# Patient Record
Sex: MALE | Race: WHITE | NOT HISPANIC OR LATINO | Employment: FULL TIME | URBAN - METROPOLITAN AREA
[De-identification: names, ages, dates, MRNs, and addresses within clinical notes are randomized per-mention and may not be internally consistent; named-entity substitution may affect disease eponyms.]

---

## 2020-10-14 ENCOUNTER — OFFICE VISIT (OUTPATIENT)
Dept: URGENT CARE | Facility: CLINIC | Age: 40
End: 2020-10-14
Payer: COMMERCIAL

## 2020-10-14 VITALS
BODY MASS INDEX: 26.48 KG/M2 | OXYGEN SATURATION: 100 % | DIASTOLIC BLOOD PRESSURE: 80 MMHG | TEMPERATURE: 96 F | HEART RATE: 100 BPM | SYSTOLIC BLOOD PRESSURE: 117 MMHG | HEIGHT: 70 IN | WEIGHT: 185 LBS

## 2020-10-14 DIAGNOSIS — R09.81 SINUS CONGESTION: Primary | ICD-10-CM

## 2020-10-14 DIAGNOSIS — Z72.0 TOBACCO USE: ICD-10-CM

## 2020-10-14 LAB
CTP QC/QA: YES
SARS-COV-2 RDRP RESP QL NAA+PROBE: NEGATIVE

## 2020-10-14 PROCEDURE — 99203 OFFICE O/P NEW LOW 30 MIN: CPT | Mod: S$GLB,,, | Performed by: NURSE PRACTITIONER

## 2020-10-14 PROCEDURE — 99203 PR OFFICE/OUTPT VISIT, NEW, LEVL III, 30-44 MIN: ICD-10-PCS | Mod: S$GLB,,, | Performed by: NURSE PRACTITIONER

## 2020-10-14 PROCEDURE — U0002 COVID-19 LAB TEST NON-CDC: HCPCS | Mod: QW,S$GLB,, | Performed by: NURSE PRACTITIONER

## 2020-10-14 PROCEDURE — U0002: ICD-10-PCS | Mod: QW,S$GLB,, | Performed by: NURSE PRACTITIONER

## 2020-10-14 RX ORDER — FLUTICASONE PROPIONATE 50 MCG
1 SPRAY, SUSPENSION (ML) NASAL DAILY
Qty: 9.9 ML | Refills: 0 | Status: SHIPPED | OUTPATIENT
Start: 2020-10-14

## 2020-10-14 RX ORDER — CETIRIZINE HYDROCHLORIDE 10 MG/1
10 TABLET ORAL DAILY
Qty: 10 TABLET | Refills: 0 | Status: SHIPPED | OUTPATIENT
Start: 2020-10-14 | End: 2020-10-24

## 2020-10-14 NOTE — LETTER
[address] [facility], [facility] ? [place], [address] ? Phone [phone] ? Fax [phone]           Return to Work/School    Patient: Rm Almaraz Jr.  YOB: 1980   Date: 10/14/2020      To Whom It May Concern:     Rm Almaraz Jr. was in contact with/seen in my office on 10/14/2020. COVID-19 is present in our communities across the state. Not all patients are eligible or appropriate to be tested. In this situation, your employee meets the following criteria:     Rm Almaraz Jr. has met the criteria for COVID-19 testing and has a NEGATIVE result. The employee can return to work once they are asymptomatic for 24 hours without the use of fever reducing medications (Tylenol, Motrin, etc).     If you have any questions or concerns, or if I can be of further assistance, please do not hesitate to contact me.     Sincerely,      Cleo Suazo NP

## 2020-10-14 NOTE — PATIENT INSTRUCTIONS
You have been diagnosed with a viral illness. Antibiotics will not help your infection to go away any faster.  You immune system must fight this illness.  You will likely have symptoms for 7-10 days as this is how long a typical virus lasts.  Below are a few things that you can do at home to help yourself feel better in the mean time.     1.  For patients above 6 months of age who are not allergic to and are not on anticoagulants, you can alternate Tylenol and Motrin every 4-6 hours for fever above 100.4F and/or pain.  For patients less than 6 months of age, allergic to or intolerant to NSAIDS, have gastritis, gastric ulcers, or history of GI bleeds, are pregnant, or are on anticoagulant therapy, you can take Tylenol every 4 hours as needed for fever above 100.4F and/or pain.     2.  Rest and keep yourself well hydrated.  Drink hot liquids (coffee, water, tea, hot chocolate, or soup) 10-12 times a day for 5-7 days.  Put liquid in a mug and place in microwave for 2.5 - 3 minutes. Pour hot liquid into another mug not used to microwave the liquid (to avoid burning your mouth) then sniff the steam from the cup and sip the heated liquid.    3.  You can use these over the counter medications/remedies to help with your symptoms:     Runny Nose:  Use an antihistamine such as Claritin, Zyrtec or Allegra to help dry you out.     Congestion:  Use pseudoephedrine (behind the counter) for congestion- Pseudoephedrine 30 mg up to 240 mg /day. Warning:  It can raise your blood pressure and give you palpitations.  Coricidin HBP is okay to use if you have high blood pressure.     Use mucinex (guaifenisin) up to 2400mg/day to break up/loosen any mucous. MucinexDM has a cough suppressant that can be used for cough and at night to stop the tickle in the back of your throat.    Use Nasal Saline to mechanically move any post nasal drip from your eustachian tubes or from the back of your throat.    Use Afrin in each nare, for no longer  than 3 days, as it is addictive. It can also dry out your mucous membranes and cause elevated blood pressure.    Use Flonase 1-2 sprays/nostril per day. It is a local acting steroid nasal spray.  If you develop a bloody nose, stop using the medication immediately.    Sore throat:  Use warm, salt water gargles to ease your throat pain- 1/2 tsp salt to 1 cup warm water, gargle as desired.  Chloraseptic sprays and throat lozenges will also help to ease throat pain.     Sometimes Nyquil at night is beneficial to help you get some rest; however, it is sedating and does contain an antihistamine and Tylenol.  Make sure not to double up on these medications.      These things will help you to feel better and will speed your recovery.  If your condition fails to improve in a timely manner, you should receive another evaluation by your Primary Care Provider/Pediatrician to discuss your concerns or return to urgent care for a recheck.  If your condition worsens at any time, you should report immediately to your nearest Emergency Department for further evaluation. **You must understand that you have received Urgent Care treatment only and that you may be released before all of your medical problems are known or treated. You, the patient, are responsible to arrange for follow-up care as instructed.

## 2020-10-14 NOTE — PROGRESS NOTES
"Subjective:       Patient ID: Rm Almaraz Jr. is a 40 y.o. male.    Vitals:  height is 5' 10" (1.778 m) and weight is 83.9 kg (185 lb). His temperature is 96.1 °F (35.6 °C). His blood pressure is 117/80 and his pulse is 100. His oxygen saturation is 100%.     Chief Complaint: Cough    40 year old male reports sinus congestion, a drip in his throat and popping ears since Monday. Pt denies fever.     Cough  This is a new problem. The current episode started in the past 7 days. The cough is productive of purulent sputum. Associated symptoms include headaches, myalgias and postnasal drip. Pertinent negatives include no chills, ear pain, eye redness, fever, hemoptysis, rash, sore throat, shortness of breath or wheezing. Treatments tried: motrin, bc powder. The treatment provided no relief. There is no history of asthma, bronchiectasis, bronchitis, COPD, emphysema, environmental allergies or pneumonia.       Constitution: Negative for activity change, appetite change, chills, sweating, fatigue, fever, unexpected weight change, generalized weakness and international travel in last 60 days.   HENT: Positive for postnasal drip. Negative for ear pain, congestion, sinus pain, sinus pressure, sore throat and voice change.    Neck: Negative for painful lymph nodes.   Eyes: Negative for eye redness.   Respiratory: Positive for cough and sputum production (clear). Negative for sleep apnea, chest tightness, bloody sputum, COPD, shortness of breath, stridor, wheezing and asthma.    Gastrointestinal: Negative for abdominal pain, nausea, vomiting and diarrhea.   Musculoskeletal: Positive for muscle ache.   Skin: Negative for rash.   Allergic/Immunologic: Negative for environmental allergies, seasonal allergies, asthma, immunizations up-to-date and flu shot.   Neurological: Positive for headaches.   Hematologic/Lymphatic: Negative for swollen lymph nodes.       Objective:      Physical Exam   Constitutional: He is oriented to " person, place, and time. He appears well-developed. He is cooperative.  Non-toxic appearance. He does not appear ill. No distress.   HENT:   Head: Normocephalic and atraumatic.   Ears:   Right Ear: Hearing, external ear and ear canal normal. A middle ear effusion is present.   Left Ear: Hearing, external ear and ear canal normal. A middle ear effusion is present.   Nose: Congestion present. No mucosal edema, rhinorrhea or nasal deformity. No epistaxis. Right sinus exhibits no maxillary sinus tenderness and no frontal sinus tenderness. Left sinus exhibits no maxillary sinus tenderness and no frontal sinus tenderness.   Mouth/Throat: Uvula is midline, oropharynx is clear and moist and mucous membranes are normal. No trismus in the jaw. Normal dentition. No uvula swelling. No oropharyngeal exudate, posterior oropharyngeal edema or posterior oropharyngeal erythema.   Eyes: Conjunctivae and lids are normal. No scleral icterus.   Neck: Trachea normal, full passive range of motion without pain and phonation normal. Neck supple. No neck rigidity. No edema and no erythema present.   Cardiovascular: Normal rate, regular rhythm, normal heart sounds and normal pulses.   Pulmonary/Chest: Effort normal and breath sounds normal. No accessory muscle usage. No respiratory distress. He has no decreased breath sounds. He has no wheezes. He has no rhonchi.   Abdominal: Normal appearance.   Musculoskeletal: Normal range of motion.         General: No deformity.   Neurological: He is alert and oriented to person, place, and time. He exhibits normal muscle tone. Coordination normal.   Skin: Skin is warm, dry, intact, not diaphoretic and not pale. Psychiatric: His speech is normal and behavior is normal. Judgment and thought content normal.   Nursing note and vitals reviewed.        Assessment:       1. Sinus congestion    2. Tobacco use        Plan:         Sinus congestion  -     POCT COVID-19 Rapid Screening  -     fluticasone  propionate (FLONASE) 50 mcg/actuation nasal spray; 1 spray (50 mcg total) by Each Nostril route once daily.  Dispense: 9.9 mL; Refill: 0  -     cetirizine (ZYRTEC) 10 MG tablet; Take 1 tablet (10 mg total) by mouth once daily. for 10 days  Dispense: 10 tablet; Refill: 0    Tobacco use  -     Ambulatory referral/consult to Smoking Cessation Program      Results for orders placed or performed in visit on 10/14/20   POCT COVID-19 Rapid Screening   Result Value Ref Range    POC Rapid COVID Negative Negative     Acceptable Yes      Patient Instructions   You have been diagnosed with a viral illness. Antibiotics will not help your infection to go away any faster.  You immune system must fight this illness.  You will likely have symptoms for 7-10 days as this is how long a typical virus lasts.  Below are a few things that you can do at home to help yourself feel better in the mean time.     1.  For patients above 6 months of age who are not allergic to and are not on anticoagulants, you can alternate Tylenol and Motrin every 4-6 hours for fever above 100.4F and/or pain.  For patients less than 6 months of age, allergic to or intolerant to NSAIDS, have gastritis, gastric ulcers, or history of GI bleeds, are pregnant, or are on anticoagulant therapy, you can take Tylenol every 4 hours as needed for fever above 100.4F and/or pain.     2.  Rest and keep yourself well hydrated.  Drink hot liquids (coffee, water, tea, hot chocolate, or soup) 10-12 times a day for 5-7 days.  Put liquid in a mug and place in microwave for 2.5 - 3 minutes. Pour hot liquid into another mug not used to microwave the liquid (to avoid burning your mouth) then sniff the steam from the cup and sip the heated liquid.    3.  You can use these over the counter medications/remedies to help with your symptoms:     Runny Nose:  Use an antihistamine such as Claritin, Zyrtec or Allegra to help dry you out.     Congestion:  Use pseudoephedrine  (behind the counter) for congestion- Pseudoephedrine 30 mg up to 240 mg /day. Warning:  It can raise your blood pressure and give you palpitations.  Coricidin HBP is okay to use if you have high blood pressure.     Use mucinex (guaifenisin) up to 2400mg/day to break up/loosen any mucous. MucinexDM has a cough suppressant that can be used for cough and at night to stop the tickle in the back of your throat.    Use Nasal Saline to mechanically move any post nasal drip from your eustachian tubes or from the back of your throat.    Use Afrin in each nare, for no longer than 3 days, as it is addictive. It can also dry out your mucous membranes and cause elevated blood pressure.    Use Flonase 1-2 sprays/nostril per day. It is a local acting steroid nasal spray.  If you develop a bloody nose, stop using the medication immediately.    Sore throat:  Use warm, salt water gargles to ease your throat pain- 1/2 tsp salt to 1 cup warm water, gargle as desired.  Chloraseptic sprays and throat lozenges will also help to ease throat pain.     Sometimes Nyquil at night is beneficial to help you get some rest; however, it is sedating and does contain an antihistamine and Tylenol.  Make sure not to double up on these medications.      These things will help you to feel better and will speed your recovery.  If your condition fails to improve in a timely manner, you should receive another evaluation by your Primary Care Provider/Pediatrician to discuss your concerns or return to urgent care for a recheck.  If your condition worsens at any time, you should report immediately to your nearest Emergency Department for further evaluation. **You must understand that you have received Urgent Care treatment only and that you may be released before all of your medical problems are known or treated. You, the patient, are responsible to arrange for follow-up care as instructed.

## 2022-10-01 ENCOUNTER — OFFICE VISIT (OUTPATIENT)
Dept: URGENT CARE | Facility: CLINIC | Age: 42
End: 2022-10-01
Payer: MEDICAID

## 2022-10-01 VITALS
BODY MASS INDEX: 27.2 KG/M2 | HEART RATE: 82 BPM | HEIGHT: 70 IN | TEMPERATURE: 97 F | DIASTOLIC BLOOD PRESSURE: 81 MMHG | SYSTOLIC BLOOD PRESSURE: 127 MMHG | RESPIRATION RATE: 16 BRPM | OXYGEN SATURATION: 98 % | WEIGHT: 190 LBS

## 2022-10-01 DIAGNOSIS — M25.521 CHRONIC PAIN OF RIGHT ELBOW: Primary | ICD-10-CM

## 2022-10-01 DIAGNOSIS — G89.29 CHRONIC PAIN OF RIGHT ELBOW: Primary | ICD-10-CM

## 2022-10-01 DIAGNOSIS — M77.9 TENDONITIS: ICD-10-CM

## 2022-10-01 PROCEDURE — 99214 OFFICE O/P EST MOD 30 MIN: CPT | Mod: S$GLB,,, | Performed by: FAMILY MEDICINE

## 2022-10-01 PROCEDURE — 99214 PR OFFICE/OUTPT VISIT, EST, LEVL IV, 30-39 MIN: ICD-10-PCS | Mod: S$GLB,,, | Performed by: FAMILY MEDICINE

## 2022-10-01 RX ORDER — TRAMADOL HYDROCHLORIDE 50 MG/1
50 TABLET ORAL EVERY 6 HOURS PRN
Qty: 10 TABLET | Refills: 0 | Status: SHIPPED | OUTPATIENT
Start: 2022-10-01 | End: 2023-10-01

## 2022-10-01 RX ORDER — ACETAMINOPHEN 500 MG
500 TABLET ORAL EVERY 6 HOURS PRN
COMMUNITY

## 2022-10-01 RX ORDER — NAPROXEN 500 MG/1
500 TABLET ORAL 2 TIMES DAILY WITH MEALS
Qty: 20 TABLET | Refills: 0 | Status: SHIPPED | OUTPATIENT
Start: 2022-10-01

## 2022-10-01 NOTE — PATIENT INSTRUCTIONS

## 2022-10-01 NOTE — PROGRESS NOTES
"Subjective:       Patient ID: Rm Almaraz Jr. is a 42 y.o. male.    Vitals:  height is 5' 10" (1.778 m) and weight is 86.2 kg (190 lb). His oral temperature is 97.1 °F (36.2 °C). His blood pressure is 127/81 and his pulse is 82. His respiration is 16 and oxygen saturation is 98%.     Chief Complaint: Joint Swelling (Patient c/o right elbow pain and swelling, states pain is worse in the a.m.  Denies injury that he knows of. Pain is worse when moving the arm around or trying to lift objects. Taking Tylenol bid with mild relief.)    Patient c/o right elbow pain and swelling, states pain is worse in the a.m.  Denies injury that he knows of. Pain is worse when moving the arm around or trying to lift objects. Taking Tylenol bid with mild relief. Last xray of the elbow was about 4 months ago.     Edema  This is a recurrent problem. The current episode started in the past 7 days. The problem occurs constantly. The problem has been unchanged. Associated symptoms include joint swelling and numbness. Pertinent negatives include no weakness. The symptoms are aggravated by bending and twisting. He has tried acetaminophen for the symptoms. The treatment provided no relief.     Constitution: Negative.   HENT: Negative.     Cardiovascular: Negative.    Eyes: Negative.    Respiratory: Negative.     Gastrointestinal: Negative.    Endocrine: negative.   Genitourinary: Negative.    Musculoskeletal:  Positive for joint swelling.   Skin: Negative.    Allergic/Immunologic: Negative.    Neurological:  Positive for numbness.   Hematologic/Lymphatic: Negative.    Psychiatric/Behavioral: Negative.       Objective:      Physical Exam   Constitutional: He is oriented to person, place, and time. He appears well-developed. He is cooperative.  Non-toxic appearance. He does not appear ill. No distress.   HENT:   Head: Normocephalic and atraumatic. Head is without abrasion, without contusion and without laceration.   Ears:   Right Ear: Hearing, " tympanic membrane, external ear and ear canal normal. No hemotympanum.   Left Ear: Hearing, tympanic membrane, external ear and ear canal normal. No hemotympanum.   Nose: Nose normal. No mucosal edema, rhinorrhea or nasal deformity. No epistaxis. Right sinus exhibits no maxillary sinus tenderness and no frontal sinus tenderness. Left sinus exhibits no maxillary sinus tenderness and no frontal sinus tenderness.   Mouth/Throat: Uvula is midline, oropharynx is clear and moist and mucous membranes are normal. No trismus in the jaw. Normal dentition. No uvula swelling. No posterior oropharyngeal erythema.   Eyes: Conjunctivae, EOM and lids are normal. Pupils are equal, round, and reactive to light. Right eye exhibits no discharge. Left eye exhibits no discharge. No scleral icterus.   Neck: Trachea normal and phonation normal. Neck supple. No tracheal deviation present. No neck rigidity present. No spinous process tenderness present. No muscular tenderness present.   Cardiovascular: Normal rate, regular rhythm, normal heart sounds and normal pulses.   Pulmonary/Chest: Effort normal and breath sounds normal. No respiratory distress.   Abdominal: Normal appearance and bowel sounds are normal. He exhibits no distension and no mass. Soft. There is no abdominal tenderness.   Musculoskeletal:         General: No deformity.      Right elbow: He exhibits decreased range of motion. Tenderness found.        Arms:    Neurological: He is alert and oriented to person, place, and time. He has normal strength. No cranial nerve deficit or sensory deficit. He exhibits normal muscle tone. He displays no seizure activity. Coordination normal. GCS eye subscore is 4. GCS verbal subscore is 5. GCS motor subscore is 6.   Skin: Skin is warm, dry, intact, not diaphoretic and not pale. Capillary refill takes less than 2 seconds. No abrasion, No burn, No bruising and No ecchymosis   Psychiatric: His speech is normal and behavior is normal. Judgment  and thought content normal.   Nursing note and vitals reviewed.      Assessment:       1. Chronic pain of right elbow    2. Tendonitis          Plan:     Offered Xray, declined by patient.    Chronic pain of right elbow    Tendonitis  -     naproxen (NAPROSYN) 500 MG tablet; Take 1 tablet (500 mg total) by mouth 2 (two) times daily with meals.  Dispense: 20 tablet; Refill: 0  -     traMADoL (ULTRAM) 50 mg tablet; Take 1 tablet (50 mg total) by mouth every 6 (six) hours as needed for Pain.  Dispense: 10 tablet; Refill: 0        Please drink plenty of fluids.  Please get plenty of rest.  Please return here or go to the Emergency Department for any concerns or worsening of condition.  If you were prescribed a narcotic medication, do not drive or operate heavy equipment or machinery while taking these medications.  If you were not prescribed an anti-inflammatory medication, and if you do not have any history of stomach/intestinal ulcers, or kidney disease, or are not taking a blood thinner such as Coumadin, Plavix, Pradaxa, Eloquis, or Xaralta for example, it is OK to take over the counter Ibuprofen or Advil or Motrin or Aleve as directed.  Do not take these medications on an empty stomach.  Rest, ice, compression and elevation to the affected joint or limb as needed.    If you were given a sling, wear it for comfort until follow up as arranged.  If you were given or placed in a splint, wear it until your follow up visit or recheck.  If you  smoke, please stop smoking.       Please follow up with your primary care doctor or specialist as needed.    [facility]  [phone]    You must understand that you have received treatment at an Urgent Care facility only, and that you may be  released before all of your medical problems are known or treated. Urgent Care facilities are not equipped to  handle life threatening emergencies. It is recommended that you seek care at an Emergency Department for  further  evaluation of worsening or concerning symptoms, or possibly life threatening conditions as  discussed.

## 2022-10-01 NOTE — LETTER
October 1, 2022  Rm Almaraz Jr.  [address] [place] [address] [address] [place] [id number]                [facility]  [address] [place] [address], [facility]  [place] [address]  Phone: [phone]  Fax: [phone] Rm Almaraz was seen and treated in our Urgent Care department on 10/1/2022. He may return to work in 2 - 3 days.      If you have any questions or concerns, please don't hesitate to call.        Sincerely,        Bandar Britt MD

## 2023-05-01 ENCOUNTER — HOSPITAL ENCOUNTER (EMERGENCY)
Facility: HOSPITAL | Age: 43
Discharge: HOME | End: 2023-05-01
Attending: STUDENT IN AN ORGANIZED HEALTH CARE EDUCATION/TRAINING PROGRAM
Payer: MEDICAID

## 2023-05-01 VITALS
TEMPERATURE: 100 F | SYSTOLIC BLOOD PRESSURE: 144 MMHG | HEART RATE: 110 BPM | BODY MASS INDEX: 25.91 KG/M2 | RESPIRATION RATE: 18 BRPM | DIASTOLIC BLOOD PRESSURE: 81 MMHG | OXYGEN SATURATION: 97 % | WEIGHT: 181 LBS | HEIGHT: 70 IN

## 2023-05-01 DIAGNOSIS — D36.7 DERMOID CYST OF LEG, LEFT: Primary | ICD-10-CM

## 2023-05-01 DIAGNOSIS — L03.90 CELLULITIS, UNSPECIFIED CELLULITIS SITE: ICD-10-CM

## 2023-05-01 PROCEDURE — 99284 EMERGENCY DEPT VISIT MOD MDM: CPT

## 2023-05-01 PROCEDURE — 96372 THER/PROPH/DIAG INJ SC/IM: CPT | Performed by: STUDENT IN AN ORGANIZED HEALTH CARE EDUCATION/TRAINING PROGRAM

## 2023-05-01 PROCEDURE — 25000003 PHARM REV CODE 250: Performed by: STUDENT IN AN ORGANIZED HEALTH CARE EDUCATION/TRAINING PROGRAM

## 2023-05-01 PROCEDURE — 63600175 PHARM REV CODE 636 W HCPCS: Performed by: STUDENT IN AN ORGANIZED HEALTH CARE EDUCATION/TRAINING PROGRAM

## 2023-05-01 RX ORDER — SULFAMETHOXAZOLE AND TRIMETHOPRIM 800; 160 MG/1; MG/1
1 TABLET ORAL 2 TIMES DAILY
Qty: 14 TABLET | Refills: 0 | Status: SHIPPED | OUTPATIENT
Start: 2023-05-02 | End: 2023-05-09

## 2023-05-01 RX ORDER — IBUPROFEN 600 MG/1
600 TABLET ORAL EVERY 6 HOURS PRN
Qty: 20 TABLET | Refills: 0 | Status: SHIPPED | OUTPATIENT
Start: 2023-05-01

## 2023-05-01 RX ORDER — CEFTRIAXONE 1 G/1
1 INJECTION, POWDER, FOR SOLUTION INTRAMUSCULAR; INTRAVENOUS
Status: COMPLETED | OUTPATIENT
Start: 2023-05-01 | End: 2023-05-01

## 2023-05-01 RX ORDER — IBUPROFEN 600 MG/1
600 TABLET ORAL
Status: COMPLETED | OUTPATIENT
Start: 2023-05-01 | End: 2023-05-01

## 2023-05-01 RX ADMIN — IBUPROFEN 600 MG: 600 TABLET ORAL at 10:05

## 2023-05-01 RX ADMIN — CEFTRIAXONE SODIUM 1 G: 1 INJECTION, POWDER, FOR SOLUTION INTRAMUSCULAR; INTRAVENOUS at 10:05

## 2023-05-01 NOTE — Clinical Note
"Rm Joseph (Randy)el was seen and treated in our emergency department on 5/1/2023.  He may return to work on 05/04/2023.       If you have any questions or concerns, please don't hesitate to call.      Yared DEMPSEY    "

## 2023-05-02 NOTE — ED PROVIDER NOTES
"  History  Chief Complaint   Patient presents with    Groin Injury     Pt presents to the ER w/ complaints of groin pain. Pt states he "had a lump on the inside of his leg'" was seen by his pcp and was told "not to worry about it." Pt states that while preforming strenuous task at work today he believes it ruptured.     42-year-old male presents for evaluation of a groin injury.  Patient states he has had the cystic structure for at least 5 years.  Patient describes it as a "lump".  Patient notes that it has been seen by his PCP who told him not worry about it.  Patient was  working today and felt as though the cyst rupture.  Patient with mild bruising in medial aspect of left thigh.  Patient also notes the lump to be smaller.  No testicular or scrotal sx endorsed.  All other systems reviewed and negative      Past Medical History:   Diagnosis Date    Carpal tunnel syndrome        Past Surgical History:   Procedure Laterality Date    FINGER SURGERY Left     left pinky    HERNIA REPAIR         Family History   Problem Relation Age of Onset    No Known Problems Father        Social History     Tobacco Use    Smoking status: Every Day     Packs/day: 0.50     Types: Cigarettes     Start date: 10/14/1991    Smokeless tobacco: Never    Tobacco comments:     wants a referral to quit 10/14/2020   Substance Use Topics    Alcohol use: Yes     Comment: occ.    Drug use: Not Currently       ROS  Review of Systems   Skin:  Positive for color change.     Physical Exam  BP (!) 144/81   Pulse 110   Temp 100.2 °F (37.9 °C)   Resp 18   Ht 5' 10" (1.778 m)   Wt 82.1 kg (181 lb)   SpO2 97%   BMI 25.97 kg/m²   Physical Exam    Constitutional: He appears well-developed and well-nourished. He is cooperative.   HENT:   Head: Normocephalic and atraumatic.   Eyes: Conjunctivae, EOM and lids are normal. Pupils are equal, round, and reactive to light.   Neck: Phonation normal.   Normal range of motion.  Cardiovascular:  Normal rate, " "regular rhythm and intact distal pulses.           Pulmonary/Chest: No stridor.   Abdominal: Abdomen is soft. There is no abdominal tenderness.   Musculoskeletal:      Cervical back: Normal range of motion.     Neurological: He is alert and oriented to person, place, and time.   Skin: Skin is warm and dry.        Psychiatric: He has a normal mood and affect. His speech is normal and behavior is normal.             Labs Reviewed - No data to display                      Procedures             Medical Decision Making  42-year-old male presents for evaluation of a groin injury.  Patient states he has had the cystic structure for at least 5 years.  Patient describes it as a "lump".  Patient notes that it has been seen by his PCP who told him not worry about it.  Patient was  working today and felt as though the cyst rupture.  Patient with mild bruising in medial aspect of left thigh.  Patient also notes the lump to be smaller.  No testicular or scrotal sx endorsed.  All other systems reviewed and negative.  Physical examination as noted above.  Discussed treatment options with the patient.  Will give antibiotics and prescription for the outpatient setting.  Patient will follow-up with PCP in the outpatient setting.  Return precautions were given                    Clinical Impression  The primary encounter diagnosis was Dermoid cyst of leg, left. A diagnosis of Cellulitis, unspecified cellulitis site was also pertinent to this visit.       Opal Treadwell MD  05/06/23 0312    "

## 2023-05-04 ENCOUNTER — HOSPITAL ENCOUNTER (EMERGENCY)
Facility: HOSPITAL | Age: 43
Discharge: HOME | End: 2023-05-04
Attending: EMERGENCY MEDICINE
Payer: MEDICAID

## 2023-05-04 VITALS
SYSTOLIC BLOOD PRESSURE: 123 MMHG | BODY MASS INDEX: 26.05 KG/M2 | OXYGEN SATURATION: 99 % | RESPIRATION RATE: 18 BRPM | HEART RATE: 95 BPM | WEIGHT: 182 LBS | DIASTOLIC BLOOD PRESSURE: 70 MMHG | HEIGHT: 70 IN | TEMPERATURE: 99 F

## 2023-05-04 DIAGNOSIS — L03.116 CELLULITIS AND ABSCESS OF LEFT LOWER EXTREMITY: Primary | ICD-10-CM

## 2023-05-04 DIAGNOSIS — L02.416 CELLULITIS AND ABSCESS OF LEFT LOWER EXTREMITY: Primary | ICD-10-CM

## 2023-05-04 PROCEDURE — 96374 THER/PROPH/DIAG INJ IV PUSH: CPT

## 2023-05-04 PROCEDURE — 96375 TX/PRO/DX INJ NEW DRUG ADDON: CPT | Mod: 59

## 2023-05-04 PROCEDURE — 10061 I&D ABSCESS COMP/MULTIPLE: CPT

## 2023-05-04 PROCEDURE — 99284 EMERGENCY DEPT VISIT MOD MDM: CPT | Mod: 25

## 2023-05-04 PROCEDURE — 63600175 PHARM REV CODE 636 W HCPCS: Performed by: EMERGENCY MEDICINE

## 2023-05-04 PROCEDURE — 25000003 PHARM REV CODE 250: Performed by: EMERGENCY MEDICINE

## 2023-05-04 RX ORDER — LIDOCAINE HYDROCHLORIDE 10 MG/ML
10 INJECTION INFILTRATION; PERINEURAL
Status: COMPLETED | OUTPATIENT
Start: 2023-05-04 | End: 2023-05-04

## 2023-05-04 RX ORDER — FENTANYL CITRATE 50 UG/ML
50 INJECTION, SOLUTION INTRAMUSCULAR; INTRAVENOUS ONCE AS NEEDED
Status: COMPLETED | OUTPATIENT
Start: 2023-05-04 | End: 2023-05-04

## 2023-05-04 RX ORDER — FENTANYL CITRATE 50 UG/ML
100 INJECTION, SOLUTION INTRAMUSCULAR; INTRAVENOUS
Status: COMPLETED | OUTPATIENT
Start: 2023-05-04 | End: 2023-05-04

## 2023-05-04 RX ORDER — HYDROCODONE BITARTRATE AND ACETAMINOPHEN 5; 325 MG/1; MG/1
1 TABLET ORAL EVERY 8 HOURS PRN
Qty: 6 TABLET | Refills: 0 | Status: SHIPPED | OUTPATIENT
Start: 2023-05-04

## 2023-05-04 RX ORDER — KETOROLAC TROMETHAMINE 30 MG/ML
15 INJECTION, SOLUTION INTRAMUSCULAR; INTRAVENOUS
Status: COMPLETED | OUTPATIENT
Start: 2023-05-04 | End: 2023-05-04

## 2023-05-04 RX ORDER — LIDOCAINE HYDROCHLORIDE 10 MG/ML
10 INJECTION, SOLUTION EPIDURAL; INFILTRATION; INTRACAUDAL; PERINEURAL ONCE
Status: DISCONTINUED | OUTPATIENT
Start: 2023-05-04 | End: 2023-05-04

## 2023-05-04 RX ADMIN — FENTANYL CITRATE 100 MCG: 50 INJECTION, SOLUTION INTRAMUSCULAR; INTRAVENOUS at 07:05

## 2023-05-04 RX ADMIN — KETOROLAC TROMETHAMINE 15 MG: 30 INJECTION, SOLUTION INTRAMUSCULAR; INTRAVENOUS at 07:05

## 2023-05-04 RX ADMIN — LIDOCAINE HYDROCHLORIDE 10 ML: 10 INJECTION, SOLUTION INFILTRATION; PERINEURAL at 06:05

## 2023-05-04 RX ADMIN — FENTANYL CITRATE 50 MCG: 50 INJECTION, SOLUTION INTRAMUSCULAR; INTRAVENOUS at 07:05

## 2023-05-04 NOTE — DISCHARGE INSTRUCTIONS
Packing can come out in 2-3 days. Try to keep the packing in until then. You may shower but do not get the wound/packing wet. Afterwards you can pull the packing out. Keep the wound clean. You may was with warm soapy water. Dry well afterwards. Follow up with your doctor for a wound check. Use a marker or photos to document the area of swelling and redness to monitor it for improvement.

## 2023-05-04 NOTE — ED PROVIDER NOTES
EMERGENCY DEPARTMENT HISTORY AND PHYSICAL EXAM     This note is dictated on M*Modal word recognition program.  There are word recognition mistakes and grammatical errors that are occasionally missed on review.     Date: 5/4/2023   Patient Name: Rm Almaraz Jr.       History of Presenting Illness      Chief Complaint   Patient presents with    Leg Pain     Came to ED on 5/1 for a cyst on leg - was given an antibiotic and states the pain is persistent and has spread down his left leg.         1815   Rm Almaraz Jr. is a 42 y.o. male with PMHX of denies significant history who presents to the emergency department C/O leg pain.    Patient reports he is diagnosed with a cyst to his left upper leg 3 days ago.  Says he is always had a small lump there for several years however began rapidly getting worse over the past few days.  States he is prescribed 600 ibuprofen and Bactrim.  Reports pain has gotten worse and swelling has gotten worse.  Reports subjective fevers.      PCP: [facility]        No current facility-administered medications for this encounter.     Current Outpatient Medications   Medication Sig Dispense Refill    acetaminophen (TYLENOL) 500 MG tablet Take 500 mg by mouth every 6 (six) hours as needed for Pain.      cetirizine (ZYRTEC) 10 MG tablet Take 1 tablet (10 mg total) by mouth once daily. for 10 days 10 tablet 0    fluticasone propionate (FLONASE) 50 mcg/actuation nasal spray 1 spray (50 mcg total) by Each Nostril route once daily. (Patient not taking: Reported on 10/1/2022) 9.9 mL 0    HYDROcodone-acetaminophen (NORCO) 5-325 mg per tablet Take 1 tablet by mouth every 8 (eight) hours as needed for Pain. 6 tablet 0    ibuprofen (ADVIL,MOTRIN) 600 MG tablet Take 1 tablet (600 mg total) by mouth every 6 (six) hours as needed for Pain. 20 tablet 0    naproxen (NAPROSYN) 500 MG tablet Take 1 tablet (500 mg total) by mouth 2 (two) times daily with meals. 20 tablet 0     "sulfamethoxazole-trimethoprim 800-160mg (BACTRIM DS) 800-160 mg Tab Take 1 tablet by mouth 2 (two) times daily. for 7 days 14 tablet 0    traMADoL (ULTRAM) 50 mg tablet Take 1 tablet (50 mg total) by mouth every 6 (six) hours as needed for Pain. 10 tablet 0           Past History     Past Medical History:   Past Medical History:   Diagnosis Date    Carpal tunnel syndrome         Past Surgical History:   Past Surgical History:   Procedure Laterality Date    FINGER SURGERY Left     left pinky    HERNIA REPAIR          Family History:   Family History   Problem Relation Age of Onset    No Known Problems Father         Social History:   Social History     Tobacco Use    Smoking status: Every Day     Packs/day: 0.50     Types: Cigarettes     Start date: 10/14/1991    Smokeless tobacco: Never    Tobacco comments:     wants a referral to quit 10/14/2020   Substance Use Topics    Alcohol use: Yes     Comment: occ.    Drug use: Not Currently        Allergies:   Review of patient's allergies indicates:  No Known Allergies       Review of Systems   Review of Systems   See HPI for pertinent positives and negatives       Physical Exam     Vitals:    05/04/23 0600 05/04/23 0706 05/04/23 0728   BP: 123/70     Pulse: 95     Resp: 18 16 18   Temp: 98.5 °F (36.9 °C)     SpO2: 99%     Weight: 82.6 kg (182 lb)     Height: 5' 10" (1.778 m)        Physical Exam  Vitals and nursing note reviewed.   Constitutional:       General: He is not in acute distress.     Appearance: Normal appearance. He is well-developed. He is not ill-appearing.   HENT:      Head: Normocephalic and atraumatic.   Eyes:      Extraocular Movements: Extraocular movements intact.      Conjunctiva/sclera: Conjunctivae normal.   Pulmonary:      Effort: Pulmonary effort is normal. No respiratory distress.   Musculoskeletal:         General: No deformity or signs of injury. Normal range of motion.      Cervical back: Normal range of motion. No rigidity.   Skin:     " General: Skin is dry.      Coloration: Skin is not pale.      Findings: No rash.      Comments: Left upper thigh into groin region there is erythema, tenderness, fluctuance.    Neurological:      General: No focal deficit present.      Mental Status: He is alert and oriented to person, place, and time.      Cranial Nerves: No cranial nerve deficit.      Motor: No weakness.      Coordination: Coordination normal.            Diagnostic Study Results      Labs -   No results found for this or any previous visit (from the past 12 hour(s)).     Radiologic Studies -    No orders to display        Medications given in the ED-   Medications   LIDOcaine HCL 10 mg/ml (1%) injection 10 mL (10 mLs Other Given 5/4/23 0638)   fentaNYL injection 100 mcg (100 mcg Intravenous Given 5/4/23 0706)   ketorolac injection 15 mg (15 mg Intravenous Given 5/4/23 0706)   fentaNYL injection 50 mcg (50 mcg Intravenous Given 5/4/23 0728)           Medical Decision Making    I am the first provider for this patient.     I reviewed the vital signs, available nursing notes, past medical history, past surgical history, family history and social history.     Vital Signs:  Reviewed the patient's vital signs.     Pulse Oximetry Analysis and Interpretation:    99% on Room Air, normal      External Test Results (Pertinent to encounter):    Records Reviewed: Nursing Notes and Current Prescription Medications    History Obtained By: Patient    Provider Notes: Rm Almaraz Jr. is a 42 y.o. male with left leg/groin abscess    Co-morbidities Considered: none    Differential Diagnosis: abscess, infected cyst      ED Course:    7:55 AM  Discussed with patient risks benefits to incision and drainage.  Patient agreeable with I&D.  Performed at bedside without complication.  Discussed care instructions at length.  Patient keep gauze in for 2-3 days remove at home.  Keep wound clean.  Can wash with soapy water.  Avoid tight-fitting clothing.  Continue with  Bactrim - not yet evident failure of therapy on bactrim. Work note provided.  Care instructions discussed.         Problems Addressed:  abscess    Procedures:   I & D - Incision and Drainage    Date/Time: 5/4/2023 7:54 AM  Location procedure was performed: [facility] EMERGENCY DEPARTMENT  Performed by: Luis Fernando Salinas MD  Authorized by: Luis Fernando Salinas MD   Type: abscess  Body area: lower extremity  Location details: left leg  Anesthesia: local infiltration    Anesthesia:  Local Anesthetic: lidocaine 1% without epinephrine  Anesthetic total: 10 mL    Patient sedated: no  Scalpel size: 11  Incision type: single straight  Incision depth: dermal  Complexity: complex  Drainage: purulent  Drainage amount: copious  Wound treatment: incision, drainage, expression of material and wound packed  Packing material: 1/4 in gauze  Complications: No  Specimens: No  Implants: No  Patient tolerance: Patient tolerated the procedure well with no immediate complications    Incision depth: dermal           Diagnosis and Disposition     Critical Care:      DISCHARGE NOTE:       Rm Almaraz Jr.'s  results have been reviewed with him.  He has been counseled regarding his diagnosis, treatment, and plan.  He verbally conveys understanding and agreement of the signs, symptoms, diagnosis, treatment and prognosis and additionally agrees to follow up as discussed.  He also agrees with the care-plan and conveys that all of his questions have been answered.  I have also provided discharge instructions for him that include: educational information regarding their diagnosis and treatment, and list of reasons why they would want to return to the ED prior to their follow-up appointment, should his condition change. He has been provided with education for proper emergency department utilization.         CLINICAL IMPRESSION:         1. Cellulitis and abscess of left lower extremity              PLAN:   1. DC  2.      Medication List        START  taking these medications      HYDROcodone-acetaminophen 5-325 mg per tablet  Commonly known as: NORCO  Take 1 tablet by mouth every 8 (eight) hours as needed for Pain.            ASK your doctor about these medications      acetaminophen 500 MG tablet  Commonly known as: TYLENOL     cetirizine 10 MG tablet  Commonly known as: ZYRTEC  Take 1 tablet (10 mg total) by mouth once daily. for 10 days     fluticasone propionate 50 mcg/actuation nasal spray  Commonly known as: FLONASE  1 spray (50 mcg total) by Each Nostril route once daily.     ibuprofen 600 MG tablet  Commonly known as: ADVIL,MOTRIN  Take 1 tablet (600 mg total) by mouth every 6 (six) hours as needed for Pain.     naproxen 500 MG tablet  Commonly known as: NAPROSYN  Take 1 tablet (500 mg total) by mouth 2 (two) times daily with meals.     sulfamethoxazole-trimethoprim 800-160mg 800-160 mg Tab  Commonly known as: BACTRIM DS  Take 1 tablet by mouth 2 (two) times daily. for 7 days     traMADoL 50 mg tablet  Commonly known as: ULTRAM  Take 1 tablet (50 mg total) by mouth every 6 (six) hours as needed for Pain.               Where to Get Your Medications        You can get these medications from any pharmacy    Bring a paper prescription for each of these medications  HYDROcodone-acetaminophen 5-325 mg per tablet        [address]. [address] [address]  [phone]    Schedule an appointment as soon as possible for a visit on 5/8/2023  Wound check    [facility] Emergency Department  [address] [place] [id number] [phone]  Go to   If symptoms worsen       _______________________________     Please note that this dictation was completed with [employer], the computer voice recognition software.  Quite often unanticipated grammatical, syntax, homophones, and other interpretive errors are inadvertently transcribed by the computer software.  Please disregard these errors.  Please excuse any errors that have  escaped final proofreading.             Luis Fernando Salinas MD  05/04/23 0757       Luis Fernando Salinas MD  05/04/23 0758

## 2023-05-04 NOTE — Clinical Note
"Rm"Jules Almaraz was seen and treated in our emergency department on 5/4/2023.  He may return to work on 05/11/2023.       If you have any questions or concerns, please don't hesitate to call.      Luis Fernando Salinas MD"